# Patient Record
(demographics unavailable — no encounter records)

---

## 2025-01-16 NOTE — DISCUSSION/SUMMARY
[Normal Growth] : growth [Normal Development] : development  [No Elimination Concerns] : elimination [Continue Regimen] : feeding [No Skin Concerns] : skin [Normal Sleep Pattern] : sleep [Anticipatory Guidance Given] : Anticipatory guidance addressed as per the history of present illness section [No Vaccines] : no vaccines needed [No Medications] : ~He/She~ is not on any medications [Patient] : patient [Mother] : mother [Full Activity without restrictions including Physical Education & Athletics] : Full Activity without restrictions including Physical Education & Athletics [FreeTextEntry4] : The cervical adenopathy needs follow up [de-identified] : Needs ENT follow up for the lymphadenopathy

## 2025-01-16 NOTE — PHYSICAL EXAM
[No Acute Distress] : no acute distress [Normocephalic] : normocephalic [EOMI Bilateral] : EOMI bilateral [Clear tympanic membranes with bony landmarks and light reflex present bilaterally] : clear tympanic membranes with bony landmarks and light reflex present bilaterally  [Pink Nasal Mucosa] : pink nasal mucosa [Nonerythematous Oropharynx] : nonerythematous oropharynx [Supple, full passive range of motion] : supple, full passive range of motion [Clear to Auscultation Bilaterally] : clear to auscultation bilaterally [Normoactive Precordium] : normoactive precordium [Regular Rate and Rhythm] : regular rate and rhythm [Normal S1, S2 audible] : normal S1, S2 audible [+2 Femoral Pulses] : +2 femoral pulses [NonTender] : non tender [Non Distended] : non distended [Normoactive Bowel Sounds] : normoactive bowel sounds [No Hepatomegaly] : no hepatomegaly [No Splenomegaly] : no splenomegaly [Soft] : soft [Non Tender] : non tender [Mobile] : mobile [< 1 cm Lymph Nodes Palpated in the following Regions:] : lymph nodes palpated in the following regions: [Posterior Cervical] : posterior cervical [Normal Muscle Tone] : normal muscle tone [No Gait Asymmetry] : no gait asymmetry [No pain or deformities with palpation of bone, muscles, joints] : no pain or deformities with palpation of bone, muscles, joints [Moves all extremities x 4] : moves all extremities x4 [Straight] : straight [No Scoliosis] : no scoliosis [+2 Patella DTR] : +2 patella DTR [Cranial Nerves Grossly Intact] : cranial nerves grossly intact [No Rash or Lesions] : no rash or lesions [FreeTextEntry6] : Patient declined the exam [FreeTextEntry8] : Innocent cardiac murmur

## 2025-01-16 NOTE — HISTORY OF PRESENT ILLNESS
[Mother] : mother [Yes] : Patient goes to dentist yearly [Tap water] : Primary Fluoride Source: Tap water [Up to date] : Up to date [Eats meals with family] : eats meals with family [Has family members/adults to turn to for help] : has family members/adults to turn to for help [Is permitted and is able to make independent decisions] : Is permitted and is able to make independent decisions [Grade: ____] : Grade: [unfilled] [Normal Performance] : normal performance [Normal Behavior/Attention] : normal behavior/attention [Eats regular meals including adequate fruits and vegetables] : eats regular meals including adequate fruits and vegetables [Normal Homework] : normal homework [Calcium source] : calcium source [Has friends] : has friends [At least 1 hour of physical activity a day] : at least 1 hour of physical activity a day [Screen time (except homework) less than 2 hours a day] : screen time (except homework) less than 2 hours a day [Has interests/participates in community activities/volunteers] : has interests/participates in community activities/volunteers. [No] : Patient has not had sexual intercourse [Has ways to cope with stress] : has ways to cope with stress [Displays self-confidence] : displays self-confidence [With Teen] : teen [NO] : No [Sleep Concerns] : no sleep concerns [Drinks non-sweetened liquids] : does not drink non-sweetened liquids  [Has concerns about body or appearance] : does not have concerns about body or appearance [Uses electronic nicotine delivery system] : does not use electronic nicotine delivery system [Exposure to electronic nicotine delivery system] : no exposure to electronic nicotine delivery system [Uses tobacco] : does not use tobacco [Exposure to tobacco] : no exposure to tobacco [Uses drugs] : does not use drugs  [Exposure to drugs] : no exposure to drugs [Drinks alcohol] : does not drink alcohol [Exposure to alcohol] : no exposure to alcohol [Has problems with sleep] : does not have problems with sleep [Gets depressed, anxious, or irritable/has mood swings] : does not get depressed, anxious, or irritable/has mood swings [Has thought about hurting self or considered suicide] : has not thought about hurting self or considered suicide [FreeTextEntry7] : several URIs [de-identified] : Does not drive

## 2025-01-16 NOTE — RISK ASSESSMENT
[0] : 2) Feeling down, depressed, or hopeless: Not at all (0) [PHQ-9 Negative - No further assessment needed] : PHQ-9 Negative - No further assessment needed [No Increased risk of SCA or SCD] : No Increased risk of SCA or SCD    [No] : Patient does not consent to screening. [[id number]] : 0 [Have you ever fainted, passed out or had an unexplained seizure suddenly and without warning, especially during exercise or in response] : Have you ever fainted, passed out or had an unexplained seizure suddenly and without warning, especially during exercise or in response to sudden loud noises such as doorbells, alarm clocks and ringing telephones? No [Have you ever had exercise-related chest pain or shortness of breath?] : Have you ever had exercise-related chest pain or shortness of breath? No [Has anyone in your immediate family (parents, grandparents, siblings) or other more distant relatives (aunts, uncles, cousins)  of heart] : Has anyone in your immediate family (parents, grandparents, siblings) or other more distant relatives (aunts, uncles, cousins)  of heart problems or had an unexpected sudden death before age 50 (This would include unexpected drownings, unexplained car accidents in which the relative was driving or sudden infant death syndrome.)? No [Are you related to anyone with hypertrophic cardiomyopathy or hypertrophic obstructive cardiomyopathy, Marfan syndrome, arrhythmogenic] : Are you related to anyone with hypertrophic cardiomyopathy or hypertrophic obstructive cardiomyopathy, Marfan syndrome, arrhythmogenic right ventricular cardiomyopathy, long QT syndrome, short QT syndrome, Brugada syndrome or catecholaminergic polymorphic ventricular tachycardia, or anyone younger than 50 years with a pacemaker or implantable defibrillator? No [FreeTextEntry2] : Never smoker